# Patient Record
Sex: FEMALE | Race: WHITE | NOT HISPANIC OR LATINO
[De-identification: names, ages, dates, MRNs, and addresses within clinical notes are randomized per-mention and may not be internally consistent; named-entity substitution may affect disease eponyms.]

---

## 2019-12-11 ENCOUNTER — APPOINTMENT (OUTPATIENT)
Dept: ORTHOPEDIC SURGERY | Facility: CLINIC | Age: 39
End: 2019-12-11
Payer: COMMERCIAL

## 2019-12-11 VITALS
HEIGHT: 65 IN | BODY MASS INDEX: 40.98 KG/M2 | HEART RATE: 91 BPM | SYSTOLIC BLOOD PRESSURE: 136 MMHG | WEIGHT: 246 LBS | DIASTOLIC BLOOD PRESSURE: 84 MMHG

## 2019-12-11 DIAGNOSIS — Z78.9 OTHER SPECIFIED HEALTH STATUS: ICD-10-CM

## 2019-12-11 DIAGNOSIS — Z86.39 PERSONAL HISTORY OF OTHER ENDOCRINE, NUTRITIONAL AND METABOLIC DISEASE: ICD-10-CM

## 2019-12-11 DIAGNOSIS — Z86.69 PERSONAL HISTORY OF OTHER DISEASES OF THE NERVOUS SYSTEM AND SENSE ORGANS: ICD-10-CM

## 2019-12-11 PROCEDURE — 73130 X-RAY EXAM OF HAND: CPT | Mod: RT

## 2019-12-11 PROCEDURE — 99203 OFFICE O/P NEW LOW 30 MIN: CPT

## 2019-12-11 PROCEDURE — 73110 X-RAY EXAM OF WRIST: CPT | Mod: RT

## 2019-12-11 RX ORDER — MULTIVITAMIN
TABLET ORAL
Refills: 0 | Status: ACTIVE | COMMUNITY

## 2019-12-11 NOTE — RETURN TO WORK/SCHOOL
[FreeTextEntry1] : Patient was seen in the office today. She is to have minimal use of her right hand. She may not work for one week when she will be reevaluated.

## 2019-12-11 NOTE — REVIEW OF SYSTEMS
[Negative] : Allergic/Immunologic [FreeTextEntry9] : See HPI [de-identified] : DM Type II [de-identified] : See HPI

## 2019-12-11 NOTE — HISTORY OF PRESENT ILLNESS
[Right] : right hand dominant [FreeTextEntry1] : She comes in today for evaluation of the right thumb and hand pain for the past 2 weeks. She has associated swelling and weakness. Her pain is worsened with use. The pain now radiates to elbow. She experiences intermittent tingling.  She denies an injury.  She has tried her 's Mobic without relief. \par \par She has type 2 diabetes.\par \par

## 2019-12-11 NOTE — PHYSICAL EXAM
[de-identified] : - Constitutional: This is a female in no obvious distress.  \par - Psych: Patient is alert and oriented to person, place and time.  Patient has a normal mood and affect.\par - Cardiovascular: Normal pulses throughout the upper extremities.  No significant varicosities are noted in the upper extremities. \par - Neuro: Strength and sensation are intact throughout the upper extremities.  Patient has normal coordination.\par - Respiratory:  Patient exhibits no evidence of shortness of breath or difficulty breathing.\par - Skin: No rashes, lesions, or other abnormalities are noted in the upper extremities.\par \par ---\par \par Examination of the right hand demonstrates mild diffuse swelling along the dorsal radial aspect of the hand.  This is in the first dorsal webspace.  She has mild tenderness along the first dorsal compartment and a mildly positive Finkelstein sign.  She also has some tenderness along the A1 pulley of the thumb without triggering.  She has mild tenderness along the second metacarpal.  She has full flexion and extension of the digits.  She is neurovascularly intact distally.\par History is [de-identified] : PA, lateral, and oblique views of the right wrist and hand demonstrate no obvious arthritis or other abnormalities.

## 2019-12-11 NOTE — ADDENDUM
[FreeTextEntry1] : I, Fantasma Mcdaniel, acted solely as a scribe for Dr. Nestor Hutton on 12/11/2019 . \par \par All medical record entries made by the Scribe were at my, Dr. Nestor Hutton, direction and personally dictated by me on 12/11/2019. I have personally reviewed the chart and agree that the record accurately reflects my personal performance of the history, physical exam, assessment and plan.

## 2019-12-11 NOTE — DISCUSSION/SUMMARY
[FreeTextEntry1] : She has findings consistent with right pain and swelling, secondary to tendinitis.  She has mild de Quervain's tendinitis and possibly a mild trigger thumb.\par \par I had a discussion regarding today's visit, the diagnosis, and treatment recommendations / options.  She was started on a course of Naprosyn 500 mg twice a day with meals for the next week or 2.  She was warned about potential GI side effects.. I also recommended the use of ice. She was fitted for a thumb spica splint.  She will follow-up in 2 to 3 weeks if her symptoms persist.\par \par The patient has agreed to this plan of management and has expressed full understanding.  All questions were fully answered to the patient's satisfaction.\par \par Over 50% of the time spent with the patient was on counseling the patient on the above diagnosis, treatment plan and prognosis.

## 2021-04-02 ENCOUNTER — APPOINTMENT (OUTPATIENT)
Dept: INTERNAL MEDICINE | Facility: CLINIC | Age: 41
End: 2021-04-02
Payer: COMMERCIAL

## 2021-04-02 VITALS
HEIGHT: 65 IN | BODY MASS INDEX: 42.82 KG/M2 | TEMPERATURE: 97.4 F | WEIGHT: 257 LBS | HEART RATE: 91 BPM | DIASTOLIC BLOOD PRESSURE: 82 MMHG | SYSTOLIC BLOOD PRESSURE: 146 MMHG | OXYGEN SATURATION: 99 %

## 2021-04-02 DIAGNOSIS — O99.810 ABNORMAL GLUCOSE COMPLICATING PREGNANCY: ICD-10-CM

## 2021-04-02 DIAGNOSIS — Z80.3 FAMILY HISTORY OF MALIGNANT NEOPLASM OF BREAST: ICD-10-CM

## 2021-04-02 DIAGNOSIS — Z82.49 FAMILY HISTORY OF ISCHEMIC HEART DISEASE AND OTHER DISEASES OF THE CIRCULATORY SYSTEM: ICD-10-CM

## 2021-04-02 DIAGNOSIS — Z00.00 ENCOUNTER FOR GENERAL ADULT MEDICAL EXAMINATION W/OUT ABNORMAL FINDINGS: ICD-10-CM

## 2021-04-02 DIAGNOSIS — Z86.32 PERSONAL HISTORY OF GESTATIONAL DIABETES: ICD-10-CM

## 2021-04-02 DIAGNOSIS — O09.299 SUPERVISION OF PREGNANCY WITH OTHER POOR REPRODUCTIVE OR OBSTETRIC HISTORY, UNSPECIFIED TRIMESTER: ICD-10-CM

## 2021-04-02 DIAGNOSIS — M77.8 OTHER ENTHESOPATHIES, NOT ELSEWHERE CLASSIFIED: ICD-10-CM

## 2021-04-02 DIAGNOSIS — O09.529 SUPERVISION OF ELDERLY MULTIGRAVIDA, UNSPECIFIED TRIMESTER: ICD-10-CM

## 2021-04-02 PROCEDURE — 99072 ADDL SUPL MATRL&STAF TM PHE: CPT

## 2021-04-02 PROCEDURE — 99386 PREV VISIT NEW AGE 40-64: CPT

## 2021-04-02 RX ORDER — NAPROXEN 500 MG/1
500 TABLET ORAL
Qty: 30 | Refills: 0 | Status: DISCONTINUED | COMMUNITY
Start: 2019-12-11 | End: 2021-04-02

## 2021-04-02 RX ORDER — FAMOTIDINE 20 MG/1
20 TABLET, FILM COATED ORAL
Refills: 0 | Status: ACTIVE | COMMUNITY

## 2021-04-02 RX ORDER — CHROMIUM 200 MCG
TABLET ORAL
Refills: 0 | Status: DISCONTINUED | COMMUNITY
End: 2021-04-02

## 2021-04-02 RX ORDER — OXYCODONE HYDROCHLORIDE AND ACETAMINOPHEN 10; 325 MG/1; MG/1
TABLET ORAL
Refills: 0 | Status: DISCONTINUED | COMMUNITY
End: 2021-04-02

## 2021-04-02 NOTE — HISTORY OF PRESENT ILLNESS
[FreeTextEntry1] : Establish care with new PCP [de-identified] : 42 yo h/o gestational DM x 2, pre-eclampsia here for check up. Missed most of her check-ups in 2020 due to COVID. States recently saw endo and GI in March. Follows with endo for DM, which recurred about 2-3 years after the birth of her son () so has been on Janumet for past approx 4 years, A1c last month 6.2%. Saw GI for severe reflux and burning, had CT scan, unclear if she had EGD,  found to have hiatal hernia and fatty liver, and now on OTC famotidine with good response. Still occasionally gets vibration/spasm sensation in her chest. Had seen GI in the past, 2 years ago, had a lot of GI symptoms, thought to be due to anxiety around her father's death, diagnosed with IBS, now gets occasional diarrhea when she is stressed and knows what this is. Also saw cardiology once 2 years ago after her father  of MI, has not gone for "yearly" return visit. Never has cardiac symptoms. Just recently saw nutritionist since trying to lose weight and, different than last attempts, has not been successful. Has not been exercising.\par Last pap  = nl, never had abnormal.\par Only mammo at age 36, "baseline".\par Recently told by janis her LDL was 101 so no statin prescribed.\par Gained #120 and #100 pounds with 2 pregnancies, was on bedrest for both.\par  both children, total of 13 months approx.\par \par \par

## 2021-04-02 NOTE — ASSESSMENT
[FreeTextEntry1] : HM - discussed varying recommendations for mammography, discussed pt's individual risk profile. By Rosie model 1%, by TC score 2.4% 10-year risk. Will order mammo now as pt has risk factors of obesity, sedentary lifestyle, and get assessment of breast density to help guide intervals for further testing. \par Pt to get vaccine records from previous PCP.

## 2021-04-02 NOTE — PHYSICAL EXAM
[Normal TMs] : both tympanic membranes were normal [No Varicosities] : no varicosities [Pedal Pulses Present] : the pedal pulses are present [No Edema] : there was no peripheral edema [No Extremity Clubbing/Cyanosis] : no extremity clubbing/cyanosis [Normal] : affect was normal and insight and judgment were intact [de-identified] : obese

## 2021-04-02 NOTE — COUNSELING
[Potential consequences of obesity discussed] : Potential consequences of obesity discussed [Benefits of weight loss discussed] : Benefits of weight loss discussed [Structured Weight Management Program suggested:] : Structured weight management program suggested [None] : None [Good understanding] : Patient has a good understanding of lifestyle changes and steps needed to achieve self management goal [FreeTextEntry1] : Weight management program, pt would benefit from meds to assist in weight loss [de-identified] : Exercise to improve conditioning, insulin sensitivity, and metabolic rate. Discussed that exercise is unlikely to result in weigh loss without additional interventions.

## 2021-04-02 NOTE — HEALTH RISK ASSESSMENT
[Yes] : Yes [Monthly or less (1 pt)] : Monthly or less (1 point) [Never (0 pts)] : Never (0 points) [0] : 2) Feeling down, depressed, or hopeless: Not at all (0) [No Retinopathy] : No retinopathy [With Family] : lives with family [College] : College [] :  [# Of Children ___] : has [unfilled] children [FreeTextEntry1] : weight loss [] : No [de-identified] : Following a 900 calorie diet with nutrition.  [LGZ2Dloub] : 0 [EyeExamDate] : Nov 2020 [MammogramDate] : 2015 [PapSmearDate] : 2019 [ColonoscopyDate] : 2019 [FreeTextEntry2] :  [FreeTextEntry3] : ages 8 and 5

## 2021-04-02 NOTE — REVIEW OF SYSTEMS
[Patient Intake Form Reviewed] : Patient intake form was reviewed [Diarrhea] : diarrhea [Incontinence] : no incontinence [Nocturia] : no nocturia [Poor Libido] : libido not poor [Frequency] : no frequency [FreeTextEntry2] : poor sleep due to anxiety; does not snore [FreeTextEntry3] : last eye exam Nov 2020 [FreeTextEntry1] : thirsty a lot and drinks 64 oz x 3 water daily

## 2021-04-02 NOTE — PAST MEDICAL HISTORY
[Menstruating] : menstruating [Menarche Age ____] : age at menarche was [unfilled] [Definite ___ (Date)] : the last menstrual period was [unfilled] [Regular Cycle Intervals] : have been regular [Total Preg ___] : G[unfilled] [Live Births ___] : P[unfilled]

## 2021-04-27 ENCOUNTER — NON-APPOINTMENT (OUTPATIENT)
Age: 41
End: 2021-04-27

## 2021-04-30 ENCOUNTER — APPOINTMENT (OUTPATIENT)
Dept: BARIATRICS/WEIGHT MGMT | Facility: CLINIC | Age: 41
End: 2021-04-30
Payer: COMMERCIAL

## 2021-04-30 VITALS — HEIGHT: 65 IN | WEIGHT: 259 LBS | BODY MASS INDEX: 43.15 KG/M2

## 2021-04-30 DIAGNOSIS — G43.909 MIGRAINE, UNSPECIFIED, NOT INTRACTABLE, W/OUT STATUS MIGRAINOSUS: ICD-10-CM

## 2021-04-30 LAB
CAU: 38 MG/DL
CREAT 24H UR-MCNC: 2 G/24 H
CREAT 24H UR-MCNC: 2 G/24 H
CREAT ?TM UR-MCNC: 110 MG/DL
CREAT ?TM UR-MCNC: 110 MG/DL
PROT ?TM UR-MCNC: 24 HR
PROT ?TM UR-MCNC: 24 HR
SPECIMEN VOL 24H UR: 1800 ML
SPECIMEN VOL 24H UR: 1800 ML
SPECIMEN VOL 24H UR: 684 MG/24 H
U URIC: 32.7 MG/DL
URATE/CREAT 24H UR: 589 MG/24HR

## 2021-04-30 PROCEDURE — 99214 OFFICE O/P EST MOD 30 MIN: CPT | Mod: 95

## 2021-04-30 PROCEDURE — 99204 OFFICE O/P NEW MOD 45 MIN: CPT | Mod: 95

## 2021-04-30 NOTE — HISTORY OF PRESENT ILLNESS
[FreeTextEntry1] : MIRTHA GASTELUM is a 41 year year old F who presents for evaluation and treatment of obesity. \par \par Weight History: Gained 120 pounds during first pregnancy 10 yrs ago, lost 60 pounds post pregnancy. In 2nd pregnancy, gained 50 pounds, and has been up and down ever since. Has tried limiting calories and will be able to lose about 20-30 pounds but finds it difficult to maintain the weight loss. Has been eating really well the last month but can not seem to lose weight. GERD and insulin resistance has gotten worse. \par \par Lowest Adult Weight: 178\par Highest Adult Weight: 264\par \par Weight loss programs/medications in the past: weight watchers \par \par Reasons for desiring weight loss: wants to lose weight and be able to maintain the weight loss \par \par PMHx: GERD, Diabetes (A1C ~6.3), Migraine, HLD \par Medications: Atorvastatin 20 mg, Janumet  mg BID, Famotidine 20 mg prn, Percocet prn for migraines \par \par FHx: Diabetes, Heart Disease, Stroke, Colon Cancer in father, paternal aunt, paternal brother and maternal aunt, Pancreatic cancer in paternal and maternal aunts, breast cancer in paternal and maternal aunts  \par SHx: 2 c- sections, gallbladder removal\par \par Patient lives  and 2 children\par Employment Status:  Design, remote\par \par Diet History:\par \par Has 2-3 regular meals a day: Sometimes skips lunch\par \par Breakfast:~8 am: Veggies with eggs\par Lunch: ~12-1 pm: either snacking or salad with fennel and turkey breast \par Dinner: ~5-6 pm: protein with salad \par \par Eating after dinner: Had been snacking after dinner but has cut that out for the last month \par \par Snacks: afternoonm almonds, fruits, cookies, goldfish snacks \par \par Fast Food: weekly: Greek, pizza\par \par Overeating episodes: rarely \par \par Water intake: 130 oz, adds small amount of cranberry juice from  [employer] \par Beverages: none\par \par Physical Activity: walks every day for 30 minutes \par \par Hours of sleep a night: ~4-6 hrs, has difficulty secondary to over thinking at night and  snoring \par \par Habits patient would like to change: healthy eating exercise \par \par \par \par \par \par \par \par

## 2021-04-30 NOTE — REASON FOR VISIT
[Home] : at home, [unfilled] , at the time of the visit. [Medical Office: ([facility])___] : at the medical office located in  [Initial Consultation] : an initial consultation for [Obesity] : obesity

## 2021-04-30 NOTE — REVIEW OF SYSTEMS
[Patient Intake Form Reviewed] : Patient intake form was reviewed [Negative] : Musculoskeletal [MED-ROS-Cons-FT] : +Fatigue, +weight gain [MED-ROS-Gastro-FT] : +hernia [MED-ROS-Neuro-FT] : +dizziness, +headaches

## 2021-05-05 ENCOUNTER — APPOINTMENT (OUTPATIENT)
Dept: INTERNAL MEDICINE | Facility: CLINIC | Age: 41
End: 2021-05-05
Payer: COMMERCIAL

## 2021-05-05 VITALS
HEIGHT: 65 IN | OXYGEN SATURATION: 99 % | TEMPERATURE: 97.5 F | DIASTOLIC BLOOD PRESSURE: 79 MMHG | HEART RATE: 75 BPM | SYSTOLIC BLOOD PRESSURE: 143 MMHG

## 2021-05-05 DIAGNOSIS — N20.0 CALCULUS OF KIDNEY: ICD-10-CM

## 2021-05-05 DIAGNOSIS — R25.3 FASCICULATION: ICD-10-CM

## 2021-05-05 LAB — HBA1C MFR BLD HPLC: 7.3

## 2021-05-05 PROCEDURE — 99072 ADDL SUPL MATRL&STAF TM PHE: CPT

## 2021-05-05 PROCEDURE — 99215 OFFICE O/P EST HI 40 MIN: CPT | Mod: 25

## 2021-05-05 PROCEDURE — 83036 HEMOGLOBIN GLYCOSYLATED A1C: CPT | Mod: QW

## 2021-05-07 PROBLEM — N20.0 CALCIUM OXALATE RENAL STONES: Status: ACTIVE | Noted: 2021-05-05

## 2021-05-07 LAB — CYSTINE 24H UR-MCNC: ABNORMAL

## 2021-05-07 NOTE — ASSESSMENT
[FreeTextEntry1] : HM - pt to schedule mammo which was ordered last visit.\par \par Total visit time 45 minutes, with additional time spend reviewing outside records.

## 2021-05-07 NOTE — PHYSICAL EXAM
[No Focal Deficits] : no focal deficits [Normal] : affect was normal and insight and judgment were intact [None] : no ulcers in either foot were found [] : left foot [de-identified] : no palpable chest wall tenderness [de-identified] : callous along medial aspect great toe and over heel and plantar aspect [TWNoteComboBox5] : +2 [TWNoteComboBox6] : +2

## 2021-05-07 NOTE — HISTORY OF PRESENT ILLNESS
[FreeTextEntry1] : f/u DM and renal stones [de-identified] : 42 yo h/o gestational DM x 2, pre-eclampsia here for follow- up. Reports she is now following a diet with decrease meat, more veggies and whole grains, smaller portions, etc, by her scale she has lost 8 pounds. Feels better, not as hungry more energy.  Admits that she had not been taking her Janumet regularly in the past, started again in Feb, takes it morning and nighttime. Was following with endo for DM, which recurred about 2-3 years after the birth of her son (2014), been on Janumet for past approx 4 years, she tolerates this well. Not sure she is going to go back to endo. I received records from previous provider with labs from 2/24/21 which showed an  A1c 7.7%, up from 6's previously. I also received records from GI whom she saw for severe reflux and burning, EGD with 6 biopsies nl, (-) H.pylori, small HH, and now her reflux is gone. Had CT scan which showed fatty liver, pt never went for fibroscan as ordered. \par Pt does report a twitching feeling in her anterior chest, not sure if this is what she was calling reflux, and she can actually feel the muscle twitch when she places her hands on her chest. Now sure if this is stress, she is under a lot as her son has a hematologic abnormality.  \par Pt with h/o renal stone, had one documented, another episode similar although did not seek care, and then non further. Drinks about 80-90oz water/day (tracks this). Takes a MVT.

## 2021-05-07 NOTE — DATA REVIEWED
[FreeTextEntry1] : Outside labs 2/21/21:\par Tchol = 180\par LDL = 102\par HDL = 55\par Vit B12 = 356\par CMP = nl\par Alb/creat ration 30 mg/g\par HgbA1c = 7.7\par \par [facility] 24 hour urine 1800 ml, Ca = 684 (H), oxalate and uric acid nl, cystine high.

## 2021-05-07 NOTE — COUNSELING
[de-identified] : To avoid stone recurrence, low Na, low animal protein diet, increase water by 8 oz daily, adequate calcium (pt given printed info on calcium content of foods.

## 2021-05-19 ENCOUNTER — APPOINTMENT (OUTPATIENT)
Dept: INTERNAL MEDICINE | Facility: CLINIC | Age: 41
End: 2021-05-19
Payer: COMMERCIAL

## 2021-05-19 ENCOUNTER — LABORATORY RESULT (OUTPATIENT)
Age: 41
End: 2021-05-19

## 2021-05-19 VITALS
TEMPERATURE: 97.3 F | DIASTOLIC BLOOD PRESSURE: 83 MMHG | SYSTOLIC BLOOD PRESSURE: 134 MMHG | BODY MASS INDEX: 41.48 KG/M2 | HEART RATE: 82 BPM | HEIGHT: 65 IN | WEIGHT: 249 LBS | OXYGEN SATURATION: 96 %

## 2021-05-19 DIAGNOSIS — R80.9 PROTEINURIA, UNSPECIFIED: ICD-10-CM

## 2021-05-19 PROCEDURE — 99214 OFFICE O/P EST MOD 30 MIN: CPT | Mod: 25

## 2021-05-19 PROCEDURE — 36415 COLL VENOUS BLD VENIPUNCTURE: CPT

## 2021-05-19 NOTE — HISTORY OF PRESENT ILLNESS
[FreeTextEntry1] : f/u  new meds [de-identified] : Pt reports she did not get the liraglutide as pharmacy didn’t have it, plans to start this Sunday. Has been walking daily and working on diet, feels good. Drinking 132-146 oz water/day. Also taking a calcium pill 1200 mg as one pill. No h/o allergies, and now notes a dry,tickle-type cough, worse at night, in her throat, not sure if it is the new med. Also taking the statin, no issues. Fasted today.

## 2021-05-19 NOTE — PHYSICAL EXAM
[Normal] : the outer ears and nose were normal in appearance and the oropharynx was normal [de-identified] : right TM wnl, left obscured by cerumen

## 2021-05-20 LAB
CHOLEST SERPL-MCNC: 128 MG/DL
HDLC SERPL-MCNC: 53 MG/DL
LDLC SERPL CALC-MCNC: 61 MG/DL
NONHDLC SERPL-MCNC: 75 MG/DL
TRIGL SERPL-MCNC: 70 MG/DL

## 2021-05-21 ENCOUNTER — APPOINTMENT (OUTPATIENT)
Dept: BARIATRICS/WEIGHT MGMT | Facility: CLINIC | Age: 41
End: 2021-05-21

## 2021-05-25 LAB
BASOPHILS # BLD AUTO: 0.07 K/UL
BASOPHILS NFR BLD AUTO: 0.9 %
EOSINOPHIL # BLD AUTO: 0.11 K/UL
EOSINOPHIL NFR BLD AUTO: 1.5 %
HCT VFR BLD CALC: 43.4 %
HGB BLD-MCNC: 12.9 G/DL
IMM GRANULOCYTES NFR BLD AUTO: 0.3 %
LYMPHOCYTES # BLD AUTO: 1.66 K/UL
LYMPHOCYTES NFR BLD AUTO: 22.2 %
MAN DIFF?: NORMAL
MCHC RBC-ENTMCNC: 24.3 PG
MCHC RBC-ENTMCNC: 29.7 GM/DL
MCV RBC AUTO: 81.9 FL
MONOCYTES # BLD AUTO: 0.52 K/UL
MONOCYTES NFR BLD AUTO: 7 %
NEUTROPHILS # BLD AUTO: 5.09 K/UL
NEUTROPHILS NFR BLD AUTO: 68.1 %
PLATELET # BLD AUTO: 316 K/UL
RBC # BLD: 5.3 M/UL
RBC # FLD: 15.2 %
WBC # FLD AUTO: 7.47 K/UL

## 2021-06-23 ENCOUNTER — NON-APPOINTMENT (OUTPATIENT)
Age: 41
End: 2021-06-23

## 2021-06-30 RX ORDER — LIRAGLUTIDE 6 MG/ML
18 INJECTION SUBCUTANEOUS
Qty: 6 | Refills: 2 | Status: DISCONTINUED | COMMUNITY
Start: 2021-05-05 | End: 2021-06-30

## 2021-07-06 ENCOUNTER — TRANSCRIPTION ENCOUNTER (OUTPATIENT)
Age: 41
End: 2021-07-06

## 2021-07-07 ENCOUNTER — APPOINTMENT (OUTPATIENT)
Dept: DERMATOLOGY | Facility: CLINIC | Age: 41
End: 2021-07-07
Payer: COMMERCIAL

## 2021-07-07 ENCOUNTER — LABORATORY RESULT (OUTPATIENT)
Age: 41
End: 2021-07-07

## 2021-07-07 ENCOUNTER — NON-APPOINTMENT (OUTPATIENT)
Age: 41
End: 2021-07-07

## 2021-07-07 PROCEDURE — 99202 OFFICE O/P NEW SF 15 MIN: CPT | Mod: 25

## 2021-07-07 PROCEDURE — 11302 SHAVE SKIN LESION 1.1-2.0 CM: CPT

## 2021-07-08 LAB
OXALATE 24 HR URINE: NORMAL
OXALATE UR-SCNC: 19.8 MG/24 H
PORPHYRINS UR-MCNC: 1800 ML/24 H

## 2021-07-15 ENCOUNTER — NON-APPOINTMENT (OUTPATIENT)
Age: 41
End: 2021-07-15

## 2021-07-21 ENCOUNTER — RESULT CHARGE (OUTPATIENT)
Age: 41
End: 2021-07-21

## 2021-07-21 ENCOUNTER — APPOINTMENT (OUTPATIENT)
Dept: INTERNAL MEDICINE | Facility: CLINIC | Age: 41
End: 2021-07-21
Payer: COMMERCIAL

## 2021-07-21 VITALS
HEART RATE: 97 BPM | WEIGHT: 242 LBS | DIASTOLIC BLOOD PRESSURE: 75 MMHG | OXYGEN SATURATION: 98 % | BODY MASS INDEX: 40.32 KG/M2 | HEIGHT: 65 IN | TEMPERATURE: 97.6 F | SYSTOLIC BLOOD PRESSURE: 125 MMHG

## 2021-07-21 DIAGNOSIS — R20.0 ANESTHESIA OF SKIN: ICD-10-CM

## 2021-07-21 PROCEDURE — 99213 OFFICE O/P EST LOW 20 MIN: CPT | Mod: 25

## 2021-07-21 PROCEDURE — 83036 HEMOGLOBIN GLYCOSYLATED A1C: CPT | Mod: QW

## 2021-07-21 RX ORDER — LISINOPRIL 10 MG/1
10 TABLET ORAL DAILY
Qty: 30 | Refills: 5 | Status: DISCONTINUED | COMMUNITY
Start: 2021-05-05 | End: 2021-07-21

## 2021-07-21 NOTE — HISTORY OF PRESENT ILLNESS
[FreeTextEntry1] : f/u DM [de-identified] : Pt here for f/u of DM. Tolerating the dulaglutide well, less Gi symptoms than the other GLP-1. Notes decrease appetite, and so has been able to follow a healthy diet, and she feels better, more energy, feels healthier. No other issues. Does note that when she was on metformin alone she had a lot of GI issues which does not occur with the combo januvia-metformin. \par Notes on her right heel a burning sensation that occurs randomly, lasts seconds, not associated with anything. Was seen by derm, had skin tag removed and told she has keloid scars on her legs, benign.

## 2021-07-21 NOTE — PHYSICAL EXAM
[Normal] : no acute distress, well nourished, well developed and well-appearing [Right Foot Was Examined] : Right foot ~C was examined [] : normal [TWNoteComboBox5] : 0

## 2021-08-11 ENCOUNTER — NON-APPOINTMENT (OUTPATIENT)
Age: 41
End: 2021-08-11

## 2021-08-26 ENCOUNTER — RX RENEWAL (OUTPATIENT)
Age: 41
End: 2021-08-26

## 2021-09-21 ENCOUNTER — NON-APPOINTMENT (OUTPATIENT)
Age: 41
End: 2021-09-21

## 2021-09-21 ENCOUNTER — RX RENEWAL (OUTPATIENT)
Age: 41
End: 2021-09-21

## 2021-09-21 RX ORDER — PEN NEEDLE, DIABETIC 29 G X1/2"
32G X 4 MM NEEDLE, DISPOSABLE MISCELLANEOUS
Qty: 1 | Refills: 4 | Status: ACTIVE | COMMUNITY
Start: 2021-05-25 | End: 1900-01-01

## 2021-09-21 RX ORDER — SITAGLIPTIN AND METFORMIN HYDROCHLORIDE 50; 1000 MG/1; MG/1
50-1000 TABLET, FILM COATED ORAL TWICE DAILY
Qty: 60 | Refills: 0 | Status: DISCONTINUED | COMMUNITY
Start: 2021-08-26 | End: 2021-09-21

## 2021-10-22 ENCOUNTER — APPOINTMENT (OUTPATIENT)
Dept: INTERNAL MEDICINE | Facility: CLINIC | Age: 41
End: 2021-10-22
Payer: COMMERCIAL

## 2021-10-22 VITALS
WEIGHT: 237 LBS | SYSTOLIC BLOOD PRESSURE: 132 MMHG | TEMPERATURE: 97.8 F | BODY MASS INDEX: 39.49 KG/M2 | OXYGEN SATURATION: 99 % | HEIGHT: 65 IN | DIASTOLIC BLOOD PRESSURE: 84 MMHG | HEART RATE: 87 BPM

## 2021-10-22 DIAGNOSIS — D49.2 NEOPLASM OF UNSPECIFIED BEHAVIOR OF BONE, SOFT TISSUE, AND SKIN: ICD-10-CM

## 2021-10-22 DIAGNOSIS — K76.0 FATTY (CHANGE OF) LIVER, NOT ELSEWHERE CLASSIFIED: ICD-10-CM

## 2021-10-22 DIAGNOSIS — K59.00 CONSTIPATION, UNSPECIFIED: ICD-10-CM

## 2021-10-22 LAB — HBA1C MFR BLD HPLC: 6

## 2021-10-22 PROCEDURE — 99214 OFFICE O/P EST MOD 30 MIN: CPT | Mod: 25

## 2021-10-22 PROCEDURE — 83036 HEMOGLOBIN GLYCOSYLATED A1C: CPT | Mod: QW

## 2021-10-22 NOTE — HISTORY OF PRESENT ILLNESS
[FreeTextEntry1] : f/u DM [de-identified] : Pt now for 3 weeks on higher dose of Trulicity, reports "no appetite" has to make herself eat. Otherwise tolerating well.\par Does note constipation for about a week and a half, finally went this am, using miralax after dulcolax and others. Currently getting her BR remodeled so that may be related. Of note pt stopped her janumet about 3 weeks ago.

## 2021-10-22 NOTE — PHYSICAL EXAM
[Normal] : no acute distress, well nourished, well developed and well-appearing [Soft] : abdomen soft [Non-distended] : non-distended [No Masses] : no abdominal mass palpated [No HSM] : no HSM [Declined Rectal Exam] : declined rectal exam [de-identified] : decreased BS, mildly tender lower quadrants

## 2021-11-23 ENCOUNTER — RX RENEWAL (OUTPATIENT)
Age: 41
End: 2021-11-23

## 2021-12-20 ENCOUNTER — APPOINTMENT (OUTPATIENT)
Dept: INTERNAL MEDICINE | Facility: CLINIC | Age: 41
End: 2021-12-20
Payer: COMMERCIAL

## 2021-12-20 DIAGNOSIS — E11.9 TYPE 2 DIABETES MELLITUS W/OUT COMPLICATIONS: ICD-10-CM

## 2021-12-20 DIAGNOSIS — E66.01 MORBID (SEVERE) OBESITY DUE TO EXCESS CALORIES: ICD-10-CM

## 2021-12-20 DIAGNOSIS — H66.90 OTITIS MEDIA, UNSPECIFIED, UNSPECIFIED EAR: ICD-10-CM

## 2021-12-20 PROCEDURE — 99442: CPT

## 2021-12-29 ENCOUNTER — NON-APPOINTMENT (OUTPATIENT)
Age: 41
End: 2021-12-29

## 2022-01-18 ENCOUNTER — APPOINTMENT (OUTPATIENT)
Dept: ORTHOPEDIC SURGERY | Facility: CLINIC | Age: 42
End: 2022-01-18
Payer: COMMERCIAL

## 2022-01-18 ENCOUNTER — NON-APPOINTMENT (OUTPATIENT)
Age: 42
End: 2022-01-18

## 2022-01-18 VITALS
SYSTOLIC BLOOD PRESSURE: 135 MMHG | HEIGHT: 64.5 IN | HEART RATE: 88 BPM | DIASTOLIC BLOOD PRESSURE: 91 MMHG | WEIGHT: 247 LBS | BODY MASS INDEX: 41.66 KG/M2

## 2022-01-18 PROCEDURE — 73030 X-RAY EXAM OF SHOULDER: CPT | Mod: RT

## 2022-01-18 PROCEDURE — 99214 OFFICE O/P EST MOD 30 MIN: CPT

## 2022-01-18 RX ORDER — DICLOFENAC SODIUM 75 MG/1
75 TABLET, DELAYED RELEASE ORAL
Qty: 1 | Refills: 0 | Status: ACTIVE | COMMUNITY
Start: 2022-01-18 | End: 1900-01-01

## 2022-01-26 ENCOUNTER — APPOINTMENT (OUTPATIENT)
Dept: ORTHOPEDIC SURGERY | Facility: CLINIC | Age: 42
End: 2022-01-26
Payer: COMMERCIAL

## 2022-01-26 ENCOUNTER — RX RENEWAL (OUTPATIENT)
Age: 42
End: 2022-01-26

## 2022-01-26 VITALS
HEART RATE: 90 BPM | BODY MASS INDEX: 41.66 KG/M2 | SYSTOLIC BLOOD PRESSURE: 139 MMHG | HEIGHT: 64.5 IN | DIASTOLIC BLOOD PRESSURE: 85 MMHG | WEIGHT: 247 LBS

## 2022-01-26 PROCEDURE — 72050 X-RAY EXAM NECK SPINE 4/5VWS: CPT

## 2022-01-26 PROCEDURE — 99214 OFFICE O/P EST MOD 30 MIN: CPT

## 2022-01-26 RX ORDER — LIDOCAINE 5% 700 MG/1
5 PATCH TOPICAL
Qty: 1 | Refills: 2 | Status: ACTIVE | COMMUNITY
Start: 2022-01-26 | End: 1900-01-01

## 2022-01-26 RX ORDER — CELECOXIB 100 MG/1
100 CAPSULE ORAL TWICE DAILY
Qty: 30 | Refills: 0 | Status: ACTIVE | COMMUNITY
Start: 2022-01-26 | End: 1900-01-01

## 2022-01-26 NOTE — HISTORY OF PRESENT ILLNESS
[Worsening] : worsening [9] : a current pain level of 9/10 [Daily] : ~He/She~ states the symptoms seem to be occuring daily [___ wks] : [unfilled] week(s) ago [de-identified] : Patient is here today for evaluation on her cervical spine. Patient saw  last week due to right shoulder radiating down into right elbow pain no known injury xrays were done and was told pain was coming from her cervical spine to see spine doctor.\par Pain along right posterior shoulder on the right from neck into shoulder down to elbow. is RHD. No numbness or tingling. Stabbing pain.\par Hx of sciatica during pregnancy on right side. Also upper back pain for 2 years.\par Started 3 weeks ago\par Diclofenac did not help. Did shoveling, does house chores [de-identified] : Using right arm  sleeping [de-identified] : not using right arm

## 2022-01-26 NOTE — PHYSICAL EXAM
[Normal] : Gait: normal [Castañeda's Sign] : positive Castañeda's sign [UE] : Sensory: Intact in bilateral upper extremities [Bicep] : biceps 2+ and symmetric bilaterally [B.R.] : biceps 2+ and symmetric bilaterally [Tricep] : triceps 2+ and symmetric bilaterally [Rad] : radial 2+ and symmetric bilaterally [de-identified] : The pt is awake, alert and oriented to self, place and time, is comfortable and in no acute distress. Inspection of neck, back and lower extremities bilaterally reveals no rashes or ecchymotic lesions.  There is no obvious abnormal spinal curvature in the sagittal and coronal planes. There is no tenderness over the cervical, thoracic or lumbar spine, or the paraspinal or upper and lower extremities musculature. There is no sacroiliac tenderness. No greater trochanteric tenderness bilaterally. No atrophy or abnormal movements noted in the upper or lower extremities. There is no swelling noted in the upper or lower extremities bilaterally. No cervical lymphadenopathy noted anteriorly. No joint laxity noted in the upper and lower extremity joints bilaterally.\par Hip range of motion is degrees internal rotation 30° external rotation without pain. Full range of motion of the shoulders bilaterally with no significant pain\par There is no groin pain with hip internal rotation and a negative MIRELLA test bilaterally.  [de-identified] : right lat rot 30 degrees with pain\par R  degrees with pain, ER 30 degrees, IR L5 versus 45 degrees and T12 on the left\par Right GT and supraspinatus tenderness [de-identified] : 4 views cervical spine demonstrate no significant scoliosis.  Cervical kyphosis with apex at the C5-6 vertebral level.  Improvement of cervical lordosis in extension.  No dynamic instability to flexion-extension.  No acute fractures.

## 2022-01-26 NOTE — DISCUSSION/SUMMARY
[Medication Risks Reviewed] : Medication risks reviewed [de-identified] : The patient today presents with symptoms of neck pain radiating to the right shoulder.  She has mild degenerative changes of the cervical spine and previously obtained x-rays also do not reveal any significant collagen the shoulder on the right side.  However on clinical evaluation she appears to have right rotator cuff tendinitis and impingement syndrome.  We discussed the option of subacromial corticosteroid injection which she is not interested in that option.  Prescribed her lidocaine patch as well as Celebrex.  Recommended physical therapy which was prescribed today.  Recommended follow-up in 2 to 4 weeks.  If her symptoms persist therapeutic and diagnostic injection of the right shoulder may be considered at follow-up.  Additional imaging studies including MRI also may be considered for further evaluation for pain and may include MRI of the right shoulder and/or the cervical spine.

## 2022-01-28 ENCOUNTER — APPOINTMENT (OUTPATIENT)
Dept: INTERNAL MEDICINE | Facility: CLINIC | Age: 42
End: 2022-01-28
Payer: COMMERCIAL

## 2022-01-28 PROCEDURE — 99442: CPT

## 2022-01-28 RX ORDER — DULAGLUTIDE 3 MG/.5ML
3 INJECTION, SOLUTION SUBCUTANEOUS
Qty: 4 | Refills: 3 | Status: ACTIVE | COMMUNITY
Start: 2021-06-30

## 2022-01-28 RX ORDER — AZITHROMYCIN 250 MG/1
250 TABLET, FILM COATED ORAL
Qty: 1 | Refills: 0 | Status: DISCONTINUED | COMMUNITY
Start: 2021-12-20 | End: 2022-01-28

## 2022-01-28 NOTE — HISTORY OF PRESENT ILLNESS
[FreeTextEntry8] : Telephonic visit = 11 mins\par \par LOS = 64206\par \par \par RX refill, request.\par \par \par No acute symptoms, reported.

## 2022-01-28 NOTE — PLAN
[FreeTextEntry1] : \par \par RX refilled, as requested.\par \par All questions answered.\par \par Pt is okay with the plan.

## 2022-03-01 ENCOUNTER — RX RENEWAL (OUTPATIENT)
Age: 42
End: 2022-03-01

## 2022-03-07 ENCOUNTER — RX RENEWAL (OUTPATIENT)
Age: 42
End: 2022-03-07

## 2022-04-20 DIAGNOSIS — M25.511 PAIN IN RIGHT SHOULDER: ICD-10-CM

## 2022-04-20 DIAGNOSIS — M75.81 OTHER SHOULDER LESIONS, RIGHT SHOULDER: ICD-10-CM

## 2022-04-22 ENCOUNTER — APPOINTMENT (OUTPATIENT)
Dept: INTERNAL MEDICINE | Facility: CLINIC | Age: 42
End: 2022-04-22

## 2022-08-03 ENCOUNTER — APPOINTMENT (OUTPATIENT)
Dept: DERMATOLOGY | Facility: CLINIC | Age: 42
End: 2022-08-03

## 2022-08-03 ENCOUNTER — LABORATORY RESULT (OUTPATIENT)
Age: 42
End: 2022-08-03

## 2022-08-03 DIAGNOSIS — D49.2 NEOPLASM OF UNSPECIFIED BEHAVIOR OF BONE, SOFT TISSUE, AND SKIN: ICD-10-CM

## 2022-08-03 DIAGNOSIS — L91.8 OTHER HYPERTROPHIC DISORDERS OF THE SKIN: ICD-10-CM

## 2022-08-03 PROCEDURE — 99212 OFFICE O/P EST SF 10 MIN: CPT | Mod: 25

## 2022-08-03 PROCEDURE — 11102 TANGNTL BX SKIN SINGLE LES: CPT

## 2022-08-03 NOTE — HISTORY OF PRESENT ILLNESS
[FreeTextEntry1] : lesions axillae [de-identified] : skin tag R axilla\par \par ?skin tag L axilla - but got a dark portion in it

## 2022-08-03 NOTE — PHYSICAL EXAM
[FreeTextEntry3] : skin colored papule R axilla\par \par pedunculated papule with black area L axilla

## 2022-08-03 NOTE — ASSESSMENT
[FreeTextEntry1] : skin tag R axilla\par benign\par snipped as courtesy\par \par lesion L axilla\par favor skin tag with cystic component within it but unsure and changing\par biopsy today\par cleaned lido/epi surgical blade to remove lesion, AlCl, bandage, wound care

## 2022-08-19 ENCOUNTER — APPOINTMENT (OUTPATIENT)
Dept: INTERNAL MEDICINE | Facility: CLINIC | Age: 42
End: 2022-08-19

## 2022-09-02 ENCOUNTER — RX RENEWAL (OUTPATIENT)
Age: 42
End: 2022-09-02

## 2022-09-20 ENCOUNTER — APPOINTMENT (OUTPATIENT)
Dept: PULMONOLOGY | Facility: CLINIC | Age: 42
End: 2022-09-20

## 2022-09-20 VITALS
BODY MASS INDEX: 41.66 KG/M2 | SYSTOLIC BLOOD PRESSURE: 128 MMHG | OXYGEN SATURATION: 99 % | DIASTOLIC BLOOD PRESSURE: 80 MMHG | HEART RATE: 93 BPM | WEIGHT: 247 LBS | HEIGHT: 64.5 IN | TEMPERATURE: 97.3 F

## 2022-09-20 DIAGNOSIS — R05.9 COUGH, UNSPECIFIED: ICD-10-CM

## 2022-09-20 DIAGNOSIS — B94.8 SEQUELAE OF OTHER SPECIFIED INFECTIOUS AND PARASITIC DISEASES: ICD-10-CM

## 2022-09-20 DIAGNOSIS — K21.9 GASTRO-ESOPHAGEAL REFLUX DISEASE W/OUT ESOPHAGITIS: ICD-10-CM

## 2022-09-20 PROCEDURE — 94060 EVALUATION OF WHEEZING: CPT

## 2022-09-20 PROCEDURE — 94727 GAS DIL/WSHOT DETER LNG VOL: CPT

## 2022-09-20 PROCEDURE — 99203 OFFICE O/P NEW LOW 30 MIN: CPT | Mod: 25

## 2022-09-20 PROCEDURE — 94729 DIFFUSING CAPACITY: CPT

## 2022-09-20 RX ORDER — PREDNISONE 20 MG/1
20 TABLET ORAL
Qty: 10 | Refills: 0 | Status: ACTIVE | COMMUNITY
Start: 2022-09-20 | End: 1900-01-01

## 2022-09-20 RX ORDER — OMEPRAZOLE 40 MG/1
40 CAPSULE, DELAYED RELEASE ORAL TWICE DAILY
Qty: 60 | Refills: 3 | Status: ACTIVE | COMMUNITY
Start: 2022-09-20 | End: 1900-01-01

## 2022-10-29 PROBLEM — B94.8 POST-VIRAL DISORDER: Status: ACTIVE | Noted: 2022-10-29

## 2022-10-29 NOTE — DISCUSSION/SUMMARY
[FreeTextEntry1] : 43 yo female with post infectious/ inflammatory complaints. Prednisone 40 mg daily for five days prescribed with PRN albuterol MDI. She was given a two week sample of trelegy 200.GERD treatment discussed. PPI was prescribed. She is to follow up with her PMD as before.

## 2022-10-29 NOTE — HISTORY OF PRESENT ILLNESS
[Never] : never [TextBox_4] : 43 yo female presents for evaluation of PRN productive cough with chest pressure for one month. The patient states that her symptoms started while in [place] after a "cold". Initially  she was febrile, treated with zpack and PRN albuterol MDI.She was subsequently evaluated in a walk in clinic, treated with zpack and oral steroids. Chest xray and covid 19 swab were negative. She has been treated with albuterol in the past, last three years ago. She has GERD like symptoms on PRN tums. Presently she denies fever, night sweats, weight loss or hemoptysis.She has never smoked. [TextBox_29] : Denies snoring, daytime somnolence, apneic episodes, AM headaches No

## 2022-10-29 NOTE — REVIEW OF SYSTEMS
[Cough] : cough [Chest Tightness] : chest tightness [Sputum] : sputum [GERD] : gerd [Negative] : Endocrine

## 2022-11-16 ENCOUNTER — RX RENEWAL (OUTPATIENT)
Age: 42
End: 2022-11-16

## 2022-11-16 RX ORDER — SITAGLIPTIN AND METFORMIN HYDROCHLORIDE 50; 1000 MG/1; MG/1
50-1000 TABLET, FILM COATED ORAL
Qty: 180 | Refills: 3 | Status: ACTIVE | COMMUNITY
Start: 2022-01-28 | End: 1900-01-01

## 2023-01-05 ENCOUNTER — RX RENEWAL (OUTPATIENT)
Age: 43
End: 2023-01-05

## 2023-01-05 RX ORDER — ATORVASTATIN CALCIUM 20 MG/1
20 TABLET, FILM COATED ORAL
Qty: 90 | Refills: 3 | Status: ACTIVE | COMMUNITY
Start: 2021-04-02 | End: 1900-01-01

## 2023-01-25 NOTE — ADDENDUM
[FreeTextEntry1] : I, Kelly Aparicio, acted solely as a scribe for Dr. Vern Norris on this date 01/27/2023.

## 2023-01-25 NOTE — CONSULT LETTER
[Dear  ___] : Dear  [unfilled], [Consult Letter:] : I had the pleasure of evaluating your patient, [unfilled]. [Please see my note below.] : Please see my note below. [Sincerely,] : Sincerely, [FreeTextEntry3] : Vern Norris MD FACS\par

## 2023-01-27 ENCOUNTER — APPOINTMENT (OUTPATIENT)
Dept: SURGICAL ONCOLOGY | Facility: CLINIC | Age: 43
End: 2023-01-27
Payer: COMMERCIAL

## 2023-03-06 ENCOUNTER — RX RENEWAL (OUTPATIENT)
Age: 43
End: 2023-03-06

## 2023-03-06 RX ORDER — LOSARTAN POTASSIUM 50 MG/1
50 TABLET, FILM COATED ORAL
Qty: 30 | Refills: 5 | Status: ACTIVE | COMMUNITY
Start: 2021-05-25 | End: 1900-01-01

## 2023-03-08 ENCOUNTER — NON-APPOINTMENT (OUTPATIENT)
Age: 43
End: 2023-03-08

## 2023-03-08 ENCOUNTER — APPOINTMENT (OUTPATIENT)
Dept: SURGICAL ONCOLOGY | Facility: CLINIC | Age: 43
End: 2023-03-08
Payer: COMMERCIAL

## 2023-03-08 VITALS
RESPIRATION RATE: 17 BRPM | HEART RATE: 78 BPM | DIASTOLIC BLOOD PRESSURE: 83 MMHG | HEIGHT: 64 IN | OXYGEN SATURATION: 98 % | BODY MASS INDEX: 40.97 KG/M2 | WEIGHT: 240 LBS | TEMPERATURE: 97.6 F | SYSTOLIC BLOOD PRESSURE: 139 MMHG

## 2023-03-08 PROCEDURE — 99204 OFFICE O/P NEW MOD 45 MIN: CPT

## 2023-03-31 NOTE — HISTORY OF PRESENT ILLNESS
[de-identified] : Patient is a 43 y/o female who presents an initial consultation for a recurrent right breast sebaceous cyst with minimal drainage but no active infection. \par \par Patient underwent bilateral mammo/sono on 1/08/23 which showed no mammographic or sonographic evidence of malignancy. (BI-RADS 2)\par \par (8/03/22): Left axilla, biopsy:\par - Follicular cyst, infundibular type\par \par PMHx: DM, allergies to Penicillin \par Family history of breast cancer in her maternal aunt. Patient tested negative for genetic testing.

## 2023-03-31 NOTE — ASSESSMENT
[FreeTextEntry1] : Recurrent right breast abscess likely epidermal inclusion cyst\par I had a long discussion with the pt and we have agreed to proceed with surgical resection to prevent future infections\par Will order Bactrim now \par Surgical procedure discussed in detail\par All questions answered\par

## 2023-03-31 NOTE — PHYSICAL EXAM
[Normal] : supple, no neck mass and thyroid not enlarged [Normal Neck Lymph Nodes] : normal neck lymph nodes  [Normal Supraclavicular Lymph Nodes] : normal supraclavicular lymph nodes [Normal Groin Lymph Nodes] : normal groin lymph nodes [Normal Axillary Lymph Nodes] : normal axillary lymph nodes [Normal] : oriented to person, place and time, with appropriate affect [de-identified] : right breast abscess

## 2023-03-31 NOTE — ADDENDUM
[FreeTextEntry1] : I, Kelly Aparicio, acted solely as a scribe for Dr. Vern Norris on this date 03/08/2023.\par

## 2023-04-25 ENCOUNTER — OUTPATIENT (OUTPATIENT)
Dept: OUTPATIENT SERVICES | Facility: HOSPITAL | Age: 43
LOS: 1 days | End: 2023-04-25
Payer: COMMERCIAL

## 2023-04-25 VITALS
OXYGEN SATURATION: 97 % | DIASTOLIC BLOOD PRESSURE: 87 MMHG | HEIGHT: 60.4 IN | RESPIRATION RATE: 18 BRPM | SYSTOLIC BLOOD PRESSURE: 138 MMHG | HEART RATE: 81 BPM | WEIGHT: 229.28 LBS | TEMPERATURE: 98 F

## 2023-04-25 DIAGNOSIS — N61.1 ABSCESS OF THE BREAST AND NIPPLE: ICD-10-CM

## 2023-04-25 DIAGNOSIS — Z90.49 ACQUIRED ABSENCE OF OTHER SPECIFIED PARTS OF DIGESTIVE TRACT: Chronic | ICD-10-CM

## 2023-04-25 DIAGNOSIS — Z90.89 ACQUIRED ABSENCE OF OTHER ORGANS: Chronic | ICD-10-CM

## 2023-04-25 DIAGNOSIS — G43.909 MIGRAINE, UNSPECIFIED, NOT INTRACTABLE, WITHOUT STATUS MIGRAINOSUS: ICD-10-CM

## 2023-04-25 DIAGNOSIS — Z98.891 HISTORY OF UTERINE SCAR FROM PREVIOUS SURGERY: Chronic | ICD-10-CM

## 2023-04-25 DIAGNOSIS — E11.9 TYPE 2 DIABETES MELLITUS WITHOUT COMPLICATIONS: ICD-10-CM

## 2023-04-25 DIAGNOSIS — I10 ESSENTIAL (PRIMARY) HYPERTENSION: ICD-10-CM

## 2023-04-25 LAB
A1C WITH ESTIMATED AVERAGE GLUCOSE RESULT: 7.7 % — HIGH (ref 4–5.6)
ANION GAP SERPL CALC-SCNC: 12 MMOL/L — SIGNIFICANT CHANGE UP (ref 7–14)
BUN SERPL-MCNC: 13 MG/DL — SIGNIFICANT CHANGE UP (ref 7–23)
CALCIUM SERPL-MCNC: 9.4 MG/DL — SIGNIFICANT CHANGE UP (ref 8.4–10.5)
CHLORIDE SERPL-SCNC: 101 MMOL/L — SIGNIFICANT CHANGE UP (ref 98–107)
CO2 SERPL-SCNC: 22 MMOL/L — SIGNIFICANT CHANGE UP (ref 22–31)
CREAT SERPL-MCNC: 0.52 MG/DL — SIGNIFICANT CHANGE UP (ref 0.5–1.3)
EGFR: 117 ML/MIN/1.73M2 — SIGNIFICANT CHANGE UP
ESTIMATED AVERAGE GLUCOSE: 174 — SIGNIFICANT CHANGE UP
GLUCOSE SERPL-MCNC: 176 MG/DL — HIGH (ref 70–99)
HCG SERPL-ACNC: <5 MIU/ML — SIGNIFICANT CHANGE UP
HCT VFR BLD CALC: 41.3 % — SIGNIFICANT CHANGE UP (ref 34.5–45)
HGB BLD-MCNC: 13.3 G/DL — SIGNIFICANT CHANGE UP (ref 11.5–15.5)
MCHC RBC-ENTMCNC: 25.4 PG — LOW (ref 27–34)
MCHC RBC-ENTMCNC: 32.2 GM/DL — SIGNIFICANT CHANGE UP (ref 32–36)
MCV RBC AUTO: 79 FL — LOW (ref 80–100)
NRBC # BLD: 0 /100 WBCS — SIGNIFICANT CHANGE UP (ref 0–0)
NRBC # FLD: 0 K/UL — SIGNIFICANT CHANGE UP (ref 0–0)
PLATELET # BLD AUTO: 316 K/UL — SIGNIFICANT CHANGE UP (ref 150–400)
POTASSIUM SERPL-MCNC: 4.2 MMOL/L — SIGNIFICANT CHANGE UP (ref 3.5–5.3)
POTASSIUM SERPL-SCNC: 4.2 MMOL/L — SIGNIFICANT CHANGE UP (ref 3.5–5.3)
RBC # BLD: 5.23 M/UL — HIGH (ref 3.8–5.2)
RBC # FLD: 13.9 % — SIGNIFICANT CHANGE UP (ref 10.3–14.5)
SODIUM SERPL-SCNC: 135 MMOL/L — SIGNIFICANT CHANGE UP (ref 135–145)
WBC # BLD: 9.45 K/UL — SIGNIFICANT CHANGE UP (ref 3.8–10.5)
WBC # FLD AUTO: 9.45 K/UL — SIGNIFICANT CHANGE UP (ref 3.8–10.5)

## 2023-04-25 PROCEDURE — 93010 ELECTROCARDIOGRAM REPORT: CPT

## 2023-04-25 RX ORDER — LOSARTAN POTASSIUM 100 MG/1
1 TABLET, FILM COATED ORAL
Refills: 0 | DISCHARGE

## 2023-04-25 RX ORDER — TRAMADOL HYDROCHLORIDE 50 MG/1
1 TABLET ORAL
Refills: 0 | DISCHARGE

## 2023-04-25 RX ORDER — SITAGLIPTIN AND METFORMIN HYDROCHLORIDE 500; 50 MG/1; MG/1
1 TABLET, FILM COATED ORAL
Refills: 0 | DISCHARGE

## 2023-04-25 RX ORDER — ATORVASTATIN CALCIUM 80 MG/1
1 TABLET, FILM COATED ORAL
Refills: 0 | DISCHARGE

## 2023-04-25 NOTE — H&P PST ADULT - HISTORY OF PRESENT ILLNESS
43 year old female with pmhx of HTN, DMII, HLD, Migraines, presents for pre-op evaluation for diagnosis of Abscess of the breast and nipple. Pt is scheduled Resection right breast mass.  43 year old female with pmhx of HTN, DMII, Epidermal inclusion cyst (right breast), HLD, Migraines, presents for pre-op evaluation for diagnosis of Abscess of the breast and nipple. Pt is scheduled Resection right breast mass.

## 2023-04-25 NOTE — H&P PST ADULT - PROBLEM SELECTOR PLAN 1
Patient tentatively scheduled for Resection right breast mass on 5/8/23.  Pre-op instructions provided. Pt given verbal and written instructions with teach back on chlorhexidine shampoo and pepcid. Pt verbalized understanding with return demonstration.       43 year old female who is a low risk patient scheduled for low risk procedure.   CBC, BMP, HGA1c, HCG, EKG were done at [facility].  No further evaluation requested.

## 2023-04-25 NOTE — H&P PST ADULT - BREASTS DETAILS
normal shape/no mass/nipples normal/no discharge or discoloration/normal Epidermal inclusion cyst- under right breast/normal shape/no mass/no tenderness/nipples normal/no discharge or discoloration/mass L/mass R/normal

## 2023-04-25 NOTE — H&P PST ADULT - NEGATIVE MUSCULOSKELETAL SYMPTOMS
no arthralgia/no arthritis/no joint swelling/no muscle weakness/no stiffness/no neck pain/no back pain

## 2023-04-25 NOTE — H&P [facility] ADULT - NEGATIVE GENERAL GENITOURINARY SYMPTOMS
no hematuria/no flank pain L/no flank pain R/no urine discoloration/no incontinence/no dysuria/no urinary hesitancy/normal urinary frequency/no nocturia

## 2023-04-25 NOTE — H&P PST ADULT - NSICDXPASTMEDICALHX_GEN_ALL_CORE_FT
PAST MEDICAL HISTORY:  Diabetes mellitus     Epidermal inclusion cyst     Gestational diabetes     Hypertension     Migraines      PAST MEDICAL HISTORY:  Diabetes mellitus     Epidermal inclusion cyst     Gestational diabetes     Hypertension     Migraines     Obesity      PAST MEDICAL HISTORY:  Abscess of the breast and nipple     Diabetes mellitus     Epidermal inclusion cyst     Gestational diabetes     Hypertension     Migraines     Obesity

## 2023-04-25 NOTE — H&P PST ADULT - NEGATIVE BREAST SYMPTOMS
no breast tenderness L/no breast lump L/no breast lump R/no nipple discharge L/no nipple discharge R no breast tenderness L/no breast tenderness R/no breast lump L/no breast lump R/no nipple discharge L/no nipple discharge R

## 2023-04-25 NOTE — H&P [facility] ADULT - FUNCTIONAL STATUS
METS- 6- Able to climb up 2 flights of stairs, walk up hill, do heavy house chores, and moderate yard work./4-10 METS

## 2023-05-07 ENCOUNTER — TRANSCRIPTION ENCOUNTER (OUTPATIENT)
Age: 43
End: 2023-05-07

## 2023-05-08 ENCOUNTER — RESULT REVIEW (OUTPATIENT)
Age: 43
End: 2023-05-08

## 2023-05-08 ENCOUNTER — TRANSCRIPTION ENCOUNTER (OUTPATIENT)
Age: 43
End: 2023-05-08

## 2023-05-08 ENCOUNTER — OUTPATIENT (OUTPATIENT)
Dept: OUTPATIENT SERVICES | Facility: HOSPITAL | Age: 43
LOS: 1 days | Discharge: HOME | End: 2023-05-08
Payer: COMMERCIAL

## 2023-05-08 ENCOUNTER — APPOINTMENT (OUTPATIENT)
Dept: SURGICAL ONCOLOGY | Facility: AMBULATORY SURGERY CENTER | Age: 43
End: 2023-05-08

## 2023-05-08 VITALS
RESPIRATION RATE: 16 BRPM | OXYGEN SATURATION: 96 % | HEART RATE: 72 BPM | SYSTOLIC BLOOD PRESSURE: 123 MMHG | TEMPERATURE: 98 F | DIASTOLIC BLOOD PRESSURE: 61 MMHG

## 2023-05-08 VITALS
WEIGHT: 227.08 LBS | TEMPERATURE: 98 F | HEIGHT: 60.4 IN | DIASTOLIC BLOOD PRESSURE: 95 MMHG | RESPIRATION RATE: 16 BRPM | HEART RATE: 88 BPM | SYSTOLIC BLOOD PRESSURE: 138 MMHG | OXYGEN SATURATION: 98 %

## 2023-05-08 DIAGNOSIS — Z90.89 ACQUIRED ABSENCE OF OTHER ORGANS: Chronic | ICD-10-CM

## 2023-05-08 DIAGNOSIS — N61.1 ABSCESS OF THE BREAST AND NIPPLE: ICD-10-CM

## 2023-05-08 DIAGNOSIS — Z90.49 ACQUIRED ABSENCE OF OTHER SPECIFIED PARTS OF DIGESTIVE TRACT: Chronic | ICD-10-CM

## 2023-05-08 DIAGNOSIS — Z98.891 HISTORY OF UTERINE SCAR FROM PREVIOUS SURGERY: Chronic | ICD-10-CM

## 2023-05-08 LAB — GLUCOSE BLDC GLUCOMTR-MCNC: 122 MG/DL — HIGH (ref 70–99)

## 2023-05-08 PROCEDURE — 19120 REMOVAL OF BREAST LESION: CPT

## 2023-05-08 PROCEDURE — 88305 TISSUE EXAM BY PATHOLOGIST: CPT | Mod: 26

## 2023-05-08 DEVICE — ARISTA 3GR: Type: IMPLANTABLE DEVICE | Site: RIGHT | Status: FUNCTIONAL

## 2023-05-08 RX ORDER — OXYCODONE HYDROCHLORIDE 5 MG/1
1 TABLET ORAL
Qty: 8 | Refills: 0
Start: 2023-05-08

## 2023-05-08 NOTE — ASU DISCHARGE PLAN (ADULT/PEDIATRIC) - ASU DC SPECIAL INSTRUCTIONSFT
Take Tylenol for pain. Follow-up with Dr. Norris in his office in two weeks. Take Bactrim antibiotic as prescribed twice a day for seven days.

## 2023-05-08 NOTE — BRIEF OPERATIVE NOTE - NSICDXBRIEFPOSTOP_GEN_ALL_CORE_FT
POST-OP DIAGNOSIS:  History of epidermal inclusion cyst excision 08-May-2023 14:27:03  Shahab Smallwood

## 2023-05-08 NOTE — ASU DISCHARGE PLAN (ADULT/PEDIATRIC) - NS MD DC FALL RISK RISK
For information on Fall & Injury Prevention, visit: [email or web address] OR  [email or web address] OR  https://www.cdc.gov/steadi/patient.html

## 2023-05-08 NOTE — ASU DISCHARGE PLAN (ADULT/PEDIATRIC) - CARE PROVIDER_API CALL
Vern Norris)  Surgery  [address] [place] [address]  Phone: [phone]  Fax: [phone]  Follow Up Time: 2 weeks

## 2023-05-08 NOTE — ASU DISCHARGE PLAN (ADULT/PEDIATRIC) - DIET/FLUID RESTRICTION
No change Progress slowly/Increase fluids Progress slowly/Increase fluids/Other (specify diet and fluid)

## 2023-05-08 NOTE — ASU DISCHARGE PLAN (ADULT/PEDIATRIC) - CALL YOUR DOCTOR IF YOU HAVE ANY OF THE FOLLOWING:
Bleeding that does not stop/Wound/Surgical Site with redness, or foul smelling discharge or pus Bleeding that does not stop/Fever greater than (need to indicate Fahrenheit or Celsius)/Wound/Surgical Site with redness, or foul smelling discharge or pus/Inability to tolerate liquids or foods Bleeding that does not stop/Swelling that gets worse/Pain not relieved by Medications/Fever greater than (need to indicate Fahrenheit or Celsius)/Wound/Surgical Site with redness, or foul smelling discharge or pus/Nausea and vomiting that does not stop/Inability to tolerate liquids or foods

## 2023-05-09 PROBLEM — L72.0 EPIDERMAL CYST: Chronic | Status: ACTIVE | Noted: 2023-04-25

## 2023-05-09 PROBLEM — E66.9 OBESITY, UNSPECIFIED: Chronic | Status: ACTIVE | Noted: 2023-04-25

## 2023-05-10 PROBLEM — E11.9 TYPE 2 DIABETES MELLITUS WITHOUT COMPLICATIONS: Chronic | Status: ACTIVE | Noted: 2023-04-25

## 2023-05-10 PROBLEM — I10 ESSENTIAL (PRIMARY) HYPERTENSION: Chronic | Status: ACTIVE | Noted: 2023-04-25

## 2023-05-10 PROBLEM — O24.419 GESTATIONAL DIABETES MELLITUS IN PREGNANCY, UNSPECIFIED CONTROL: Chronic | Status: ACTIVE | Noted: 2023-04-25

## 2023-05-10 PROBLEM — G43.909 MIGRAINE, UNSPECIFIED, NOT INTRACTABLE, WITHOUT STATUS MIGRAINOSUS: Chronic | Status: ACTIVE | Noted: 2023-04-25

## 2023-05-10 PROBLEM — N61.1 ABSCESS OF THE BREAST AND NIPPLE: Chronic | Status: ACTIVE | Noted: 2023-04-25

## 2023-05-11 LAB — SURGICAL PATHOLOGY STUDY: SIGNIFICANT CHANGE UP

## 2023-05-15 ENCOUNTER — APPOINTMENT (OUTPATIENT)
Dept: SURGICAL ONCOLOGY | Facility: CLINIC | Age: 43
End: 2023-05-15
Payer: COMMERCIAL

## 2023-05-15 VITALS
SYSTOLIC BLOOD PRESSURE: 134 MMHG | HEART RATE: 77 BPM | TEMPERATURE: 97.6 F | RESPIRATION RATE: 17 BRPM | HEIGHT: 64 IN | DIASTOLIC BLOOD PRESSURE: 76 MMHG | OXYGEN SATURATION: 99 % | BODY MASS INDEX: 39.09 KG/M2 | WEIGHT: 229 LBS

## 2023-05-15 DIAGNOSIS — N61.1 ABSCESS OF THE BREAST AND NIPPLE: ICD-10-CM

## 2023-05-15 PROCEDURE — 99024 POSTOP FOLLOW-UP VISIT: CPT

## 2023-05-15 NOTE — PHYSICAL EXAM
[de-identified] : Right lower inner quadrant incision healing well without evidence of infection, no active bleeding, dry dressing applied

## 2023-05-15 NOTE — HISTORY OF PRESENT ILLNESS
[FreeTextEntry1] : 1 week status post resection right breast epidermal inclusion cyst for chronic infection\par Pathology consistent with ruptured epidermal inclusion cyst, no evidence of malignancy\par Patient noted drainage from the right breast incision yesterday\par Denies any signs or symptoms of infection

## 2023-05-15 NOTE — ASSESSMENT
[FreeTextEntry1] : 1 week status post resection right breast epidermal inclusion cyst\par Patient developed drainage of postop seroma yesterday, no active drainage at present and no evidence of infection\par Reassured patient\par Dry dressing as needed\par RTO as needed\par \par \par Enclosed pathology report

## 2023-05-15 NOTE — CONSULT LETTER
[Dear  ___] : Dear  [unfilled], [Courtesy Letter:] : I had the pleasure of seeing your patient, [unfilled], in my office today. [Please see my note below.] : Please see my note below. [Sincerely,] : Sincerely, [FreeTextEntry3] : Vern Norris, FACS FSSO

## 2023-05-24 ENCOUNTER — APPOINTMENT (OUTPATIENT)
Dept: SURGICAL ONCOLOGY | Facility: CLINIC | Age: 43
End: 2023-05-24

## 2024-04-12 RX ORDER — SULFAMETHOXAZOLE AND TRIMETHOPRIM 800; 160 MG/1; MG/1
800-160 TABLET ORAL TWICE DAILY
Qty: 20 | Refills: 0 | Status: ACTIVE | COMMUNITY
Start: 2023-03-08 | End: 1900-01-01

## 2024-05-22 ENCOUNTER — APPOINTMENT (OUTPATIENT)
Dept: SURGICAL ONCOLOGY | Facility: CLINIC | Age: 44
End: 2024-05-22

## 2024-05-22 DIAGNOSIS — N64.89 OTHER SPECIFIED DISORDERS OF BREAST: ICD-10-CM

## 2024-06-03 NOTE — PACU DISCHARGE NOTE - NSCLINEINSERTRD_GEN_ALL_CORE
Post op instruction:  1- D/C home  2- Dx Right ankle retained syndesmosis screw.  3- Weight Bearing As Tolerated right ankle  4- Elevation surgical site, with ice  5- Keep Drsg dry and clean  6- Follow Up in 2 weeks.       Alan Salinas MD, 6/3/2024    
No

## (undated) DEVICE — LAP PAD W RING 18 X 18"

## (undated) DEVICE — RADIOGRAPHY DVC SPEC TRANSPEC

## (undated) DEVICE — DRSG MAMMARY SUPPORT MED SIZE 3

## (undated) DEVICE — ELCTR GROUNDING PAD ADULT COVIDIEN

## (undated) DEVICE — SUT MONOCRYL 3-0 18" PS-2 UNDYED

## (undated) DEVICE — DRSG MAMMARY SUPPORT SM SIZE 1

## (undated) DEVICE — DRAPE CHEST BREAST 106" X 122"

## (undated) DEVICE — WARMING BLANKET LOWER ADULT

## (undated) DEVICE — DRSG STERISTRIPS 0.5 X 4"

## (undated) DEVICE — ELCTR BOVIE TIP BLADE INSULATED 4" EDGE

## (undated) DEVICE — NDL HYPO SAFE 25G X 1" (ORANGE)

## (undated) DEVICE — SOL IRR POUR H2O 500ML

## (undated) DEVICE — SUT MONOCRYL 4-0 27" PS-2 UNDYED

## (undated) DEVICE — GLV 7 PROTEXIS (WHITE)

## (undated) DEVICE — DRSG TEGADERM 4X4.75"

## (undated) DEVICE — POSITIONER PATIENT SAFETY STRAP 3X60"

## (undated) DEVICE — POSITIONER FOAM EGG CRATE ULNAR 2PCS (PINK)

## (undated) DEVICE — DRSG MAMMARY SUPPORT XL SIZE 5

## (undated) DEVICE — SOL IRR POUR NS 0.9% 500ML

## (undated) DEVICE — ELCTR ROCKER SWITCH PENCIL BLUE 10FT

## (undated) DEVICE — DRAPE 3/4 SHEET 52X76"

## (undated) DEVICE — VENODYNE/SCD SLEEVE CALF MEDIUM

## (undated) DEVICE — DRSG MAMMARY SUPPORT MED SIZE 2

## (undated) DEVICE — GOWN LG

## (undated) DEVICE — DRAPE TOWEL BLUE 17" X 24"

## (undated) DEVICE — DRAPE INSTRUMENT POUCH 6.75" X 11"

## (undated) DEVICE — PACK MINOR NO DRAPE

## (undated) DEVICE — SUT SILK 2-0 18" FS

## (undated) DEVICE — GLV 7.5 PROTEXIS (WHITE)

## (undated) DEVICE — SUCTION YANKAUER NO CONTROL VENT

## (undated) DEVICE — SUT PLAIN GUT FAST ABSORBING 5-0 PC-1

## (undated) DEVICE — DRSG MAMMARY SUPPORT LG SIZE 4